# Patient Record
Sex: FEMALE | URBAN - METROPOLITAN AREA
[De-identification: names, ages, dates, MRNs, and addresses within clinical notes are randomized per-mention and may not be internally consistent; named-entity substitution may affect disease eponyms.]

---

## 2024-01-22 ENCOUNTER — APPOINTMENT (RX ONLY)
Dept: URBAN - METROPOLITAN AREA CLINIC 98 | Facility: CLINIC | Age: 24
Setting detail: DERMATOLOGY
End: 2024-01-22

## 2024-01-22 DIAGNOSIS — Z41.9 ENCOUNTER FOR PROCEDURE FOR PURPOSES OTHER THAN REMEDYING HEALTH STATE, UNSPECIFIED: ICD-10-CM

## 2024-01-22 PROCEDURE — ? DVT RISK ASSESSMENT

## 2024-01-22 PROCEDURE — ? ADDITIONAL NOTES

## 2024-01-22 NOTE — PROCEDURE: ADDITIONAL NOTES
Render Risk Assessment In Note?: yes
Additional Notes: Patient's concerns include: \\n-  Breast lift and augmentation. \\n    - Currently is a 34 DD. \\n    -  Would like to be as natural as possible, but showed more augmented want photos. \\n    -  1-1.5 cup size increase, but unsure. Just want upper pole fullness, but no round breasts. \\n\\n\\nFactors altering surgical decisions (hx/exam findings): \\n-  Location:  [place], [place].\\n-  Referred:  Norma Zacarias.\\n-  Hx:  No prior pregnancies. 40 lbs weight loss. \\n-  Vitals:  128/63, 78. 205 lbs, 5' 10\". \\n-  Nicotine use:  Never. \\n-  Allergies:  NKDA. \\n-  PMH:  None.\\n-  PSH:  None.\\n-  Exam:  Mild asymmetry (Right increase volume/ptosis vs. Left breast). Bilateral grade 2 ptosis. Fatty breast tissue density. Superior breast pole stretch marks noted. Larger areolas. >2 cm soft tissue pinch along upper and lower breast poles. \\n\\n- Reviewed typical surgical recovery, activity restrictions (patient rock climbs), and surgical risks including infection, bleeding/hematoma, injury to adjacent anatomic structures, hypertrophic/keloid scars, asymmetry, delayed wound healing, skin and/or nipple necrosis, capsular contracture, JOSE-ALCL, BII. Discussed expected breast shape and changes during the post-op period. Discussed cannot guarantee a particular cup size. Also reviewed potential for retained areolar pigment in vertical incision limb. \\n\\n-  Preop requirements:\\n    -  Send at least 3 \"wish pics\" for breast size.  \\n\\n\\nProposed intervention(s):\\n-  Bilateral breast augmentation\\n    -  Implant type:  Moderate profile, Regular cohesive (SRM)\\n    -  Size range:  275, 295, 310, 330, 345, 360 cc (D:  11.5, 11.75, 12, 12.25, 12.5, 12.75)\\n    -  Target size:  330 cc \\n-  Bilateral mastopexy\\n    -  Wise pattern incision

## 2024-02-26 ENCOUNTER — RX ONLY (OUTPATIENT)
Age: 24
Setting detail: RX ONLY
End: 2024-02-26

## 2024-02-26 ENCOUNTER — APPOINTMENT (RX ONLY)
Dept: URBAN - METROPOLITAN AREA CLINIC 101 | Facility: CLINIC | Age: 24
Setting detail: DERMATOLOGY
End: 2024-02-26

## 2024-02-26 DIAGNOSIS — Z41.9 ENCOUNTER FOR PROCEDURE FOR PURPOSES OTHER THAN REMEDYING HEALTH STATE, UNSPECIFIED: ICD-10-CM

## 2024-02-26 PROCEDURE — ? PRE-OP WORKLIST

## 2024-02-26 PROCEDURE — ? ORDER TESTS

## 2024-02-26 RX ORDER — OXYCODONE HYDROCHLORIDE 5 MG/1
TABLET ORAL
Qty: 10 | Refills: 0 | Status: ACTIVE

## 2024-02-26 RX ORDER — ONDANSETRON 4 MG/1
1 TABLET, FILM COATED ORAL
Qty: 20 | Refills: 1 | Status: ERX | COMMUNITY
Start: 2024-02-26

## 2024-02-26 RX ORDER — CEPHALEXIN 500 MG/1
1 CAPSULE ORAL QID
Qty: 28 | Refills: 2 | Status: ERX | COMMUNITY
Start: 2024-02-26

## 2024-02-26 RX ORDER — CYCLOBENZAPRINE HYDROCHLORIDE 5 MG/1
1 TABLET, FILM COATED ORAL
Qty: 15 | Refills: 1 | Status: ERX | COMMUNITY
Start: 2024-02-26

## 2024-02-26 NOTE — PROCEDURE: PRE-OP WORKLIST
Recovery Facility: [facility]
Date Of Surgery: 03/26/24
Admission Status: outpatient
Surgeon: Dr. Antonio Duarte
Additional Instructions (Optional): Re-mote pre-op done by Mayte via phone.
Surgery Scheduled: Breast Augmentation with wise pattern lift
Detail Level: Detailed
Coordination With:: Mayte Kendall  ( Remote )
Estimated Length Of Stay: 1-2 Hours
Date Of Evaluation: 2/26/24
Photos Taken?: no

## 2024-03-25 ENCOUNTER — APPOINTMENT (RX ONLY)
Dept: URBAN - METROPOLITAN AREA CLINIC 101 | Facility: CLINIC | Age: 24
Setting detail: DERMATOLOGY
End: 2024-03-25

## 2024-03-25 DIAGNOSIS — Z41.9 ENCOUNTER FOR PROCEDURE FOR PURPOSES OTHER THAN REMEDYING HEALTH STATE, UNSPECIFIED: ICD-10-CM

## 2024-03-25 PROCEDURE — ? ADDITIONAL NOTES

## 2024-03-25 NOTE — PROCEDURE: ADDITIONAL NOTES
Additional Notes: ------------------------------\\nOPERATIVE NOTE:\\n-------------------------------\\n\\nProcedure: \\n-  Bilateral breast augmentation (dual plane). \\n-  Bilateral mastopexy (wise pattern incision).\\n-  Liposuction to abdomen and bilateral flanks. \\n\\nSurgeon:  \\n-  Antonio Duarte MD\\n\\nAnesthesia Provider:  \\n-  Essie Strickland CRNA\\n\\nAnesthesia:  \\n-  TIVA, LMA\\n\\nEstimated Blood Loss:  \\n-  Minimal\\n\\nComplications:  \\n-  None\\n\\nSpecimens:  \\n-  None\\n\\nPreoperative Diagnosis:\\n-  Micromastia\\n-  Breast ptosis\\n-  Breast asymmetry\\n-  Lateral thigh contour irregularities \\n\\nPostoperative Diagnosis: \\n-  Same as above\\n\\n-------------------------------\\n\\nProcedure Description:\\n\\nUnder full, informed consent, the patient was brought to the operating room. \\nFull continuous cardiac monitoring and automated blood-pressure measurements were performed per protocol. \\n\\nPatient was prepped circumferentially with a chlorhexidien solution in a standing position with arms out to the side. Patient was placed in supine position with arms out at side to <90 degrees onto a sterilely draped OR bed ensuring that hips were placed over bed joint for later flexion if needed. All bony prominences were padded and SCDs placed on lower legs. Patient underwent LMA intubation without difficulty. Skin was prepped again with a chlorhexidien solution and draped in a sterile fashion. \\n\\n\\n-  Bilateral breast augmentation (dual plane). \\n-  Bilateral mastopexy (wise pattern incision).\\n\\nPreop markings were checked for symmetry and markings reinforced. A nerve block was performed with Exparel along lateral, medial, and inferior borders of the breasts between the deep breast parenchyma and chest wall. \\n\\nCentral IMF incision was injected using local anesthesia (lidocaine 1% with 1:100,000 epinephrine). IMF incision was created with scalpel. Assistant used a double skin hook retractor, and dissection was made down to the chest wall. Skin hooks were exchanged for an OpenCloudy retractor. Dissection was then extended superiorly to the edge of pectoralis muscle. Mammary tissue was reflected off of the pectoralis muscle 1-2 cm superiorly from the inferior edge of the pectoralis muscle insertion.  \\n\\nPectoralis muscle was bluntly  at most lateral edge attaching it to the chest wall, this was further divided with bovie cautery until a finger could be placed in and a plane dissected bluntly, further pocket dissection was performed with bovie cautery ensuring that pectoralis minor and serratus muscles were kept in normal anatomic positions. Margins of the implant pocket were dissected to fit implant dimensions with maximal dissection limits based off the following - pectoralis chest wall attachments medially, clavicle superiorly, with limited lateral dissection to avoid later lateral implant malposition.\\n\\nA breast retractor was then used to visualize and achieve hemostasis with an extended/protected tip bovie cautery. Pectoralis muscle was divided with bovie cautery medially and up to the edge of the incision. With muscle stretched anteriorly with a breast retractor, muscle fibers were divided until a layer of fat could be seen. Division was performed 2-3 cm anterior to the inferior muscle wall attachment. \\n\\nA saline sizer was then placed and inflated to the desired size, the pocket was adjusted as necessary. Pocket closed with a running 3-0 monocryl suture. Steps were repeated on the contralateral side.\\n\\nSkin was tailor tacked closed using staples to take up any excess skin along the breast, ensuring vertical incision limb measured between 6-8 cm in length. Tailor tacked limbs were marked and staples removed. NAC was placed under equal stretch bilaterally and marked using a 40 mm NAC marker. Skin was scored along markings and skin de-epitheliazed within marked areas with scalpel. \\n\\nMammary tissue was excised, to a level superficial to the underlying breast fascia, along central vertical breast and inferior horizontal breast. Hemostasis achieved with bovie cautery. Superior NAC was sutured to superior central edge of de-epitheliazed skin and vertical and horizontal breast axis were tailor tacked in place with staples. Bilateral breast were measured for symmetry. \\n\\n-  Right breast tissue excision:  47 g (larger breast on preop)\\n-  Left breast tissue excision:  29 g\\n-  Total breast tissue excision:  76 g\\n\\nNAC was inset along 8 axises, vertical, and horizontal skin incisions were closed with multiple buried interrupted 3-0 monocryl sutures, leaving the central portion of IMF incision open for implant exchange. \\n\\nPocket sutures cut, sizer deflated by removing one way valve, pocket washed out with triple antibiotic solution x3 and then TXA solution. Hemostasis was assessed and bovie cautery used as needed to achieve hemostasis. Skin edges were washed with hibiclens and sterile towels laid down. Both assistant and surgeon then changed gloves and implant placed using a Meadows funnel with minimal touch technique. Silicone implants placed:\\n\\n-  Right breast implant:  345 cc SRM (smooth, round, moderate profile, low cohesive silicone breast implant)\\n-  Left breast implant:  345 cc SRM (smooth, round, moderate profile, low cohesive silicone breast implant)\\n\\nUnderlying fascia closed deeply with multiple interrupted 3-0 PDS suture. Deep mammary tissue closed with multiple interrupted 3-0 PDS suture. Dermis closed with 3-0 monocryl. \\n\\nSkin was closed with a subcuticular running 5-0 monocryl around the NAC and 4-0 monocryl along vertical and horizontal incisions. Surgical glue and steri-strips were placed over incisions. \\n\\n\\n-  Liposuction to abdomen and bilateral flanks. \\n\\nLiposuction cannula sites were marked and injected using lidocaine with epinephrine. ~1 cm stab incisions were made at marking sites and soft tissue spread with a hemostat. Tumescent was injected within the soft tissue plane with use of cannula. \\n\\nTumescent was injected into the following areas: \\n-  Abdomen:  700 ml. \\n-  Right flank:  300 ml. \\n-  Left flank:  300 ml. \\n-  Total:  1,300 ml. \\n\\nTumescent was allowed to sit for ~10 mins prior to performing liposuction. \\n\\nSoft tissue pre-tunneling and fat separation was preformed with a 4 mm exploded tip lipo cannula (no suction connected).  \\n\\nLiposuction was then performed to preoperatively marked target areas. Dilute hibiclens was applied for lubrication regularly around the port sites. It was ensured that skin was not being tented up or incision edges were not being significantly distorted during cannula movement. Mechanical assist was used to prevent any unnecessarily firm cannula movements. A fanning and cross hatching pattern was used to decrease likelihood of later skin contour irregularities. Skin flaps were pinched regularly to assess uniform flap thickness and target any unevenness. \\n\\nLipoaspirate was collected from the following areas: \\n-  Abdomen:  400 ml.\\n-  Right flank:  200 ml. \\n-  Left flank:  200 ml. \\n-  Total:  800 ml. \\n\\nFat equalization was preformed with a 4 mm exploded tip lipo cannula (no suction connected).  \\n\\nIncisions were closed with a buried interrupted 4-0 monocryl. Bandaids placed over incisions. Topifoam placed evenly over areas of liposuction and abdominal binder placed firmly over the abdomen. Breast was covered in abdominal pad and then surgical bra placed. \\n\\nSponge counts and needle counts were reported correct by the circulating nurse in charge.\\nThe patient was transferred to the recovery area in stable condition. \\nPatient tolerated the procedure well without complication.\\nI was present for the entire procedure.\\n\\n-------------------------------
Render Risk Assessment In Note?: no

## 2024-03-26 ENCOUNTER — APPOINTMENT (RX ONLY)
Dept: URBAN - METROPOLITAN AREA CLINIC 128 | Facility: CLINIC | Age: 24
Setting detail: DERMATOLOGY
End: 2024-03-26

## 2024-03-27 ENCOUNTER — APPOINTMENT (RX ONLY)
Dept: URBAN - METROPOLITAN AREA CLINIC 101 | Facility: CLINIC | Age: 24
Setting detail: DERMATOLOGY
End: 2024-03-27

## 2024-03-27 DIAGNOSIS — Z41.9 ENCOUNTER FOR PROCEDURE FOR PURPOSES OTHER THAN REMEDYING HEALTH STATE, UNSPECIFIED: ICD-10-CM

## 2024-03-27 PROCEDURE — 99024 POSTOP FOLLOW-UP VISIT: CPT

## 2024-03-27 PROCEDURE — ? COUNSELING - POST-OP CHECK

## 2024-04-01 ENCOUNTER — APPOINTMENT (RX ONLY)
Dept: URBAN - METROPOLITAN AREA CLINIC 101 | Facility: CLINIC | Age: 24
Setting detail: DERMATOLOGY
End: 2024-04-01

## 2024-04-01 DIAGNOSIS — Z41.9 ENCOUNTER FOR PROCEDURE FOR PURPOSES OTHER THAN REMEDYING HEALTH STATE, UNSPECIFIED: ICD-10-CM

## 2024-04-01 PROCEDURE — ? ADDITIONAL NOTES

## 2024-04-15 ENCOUNTER — APPOINTMENT (RX ONLY)
Dept: URBAN - METROPOLITAN AREA CLINIC 101 | Facility: CLINIC | Age: 24
Setting detail: DERMATOLOGY
End: 2024-04-15

## 2024-04-15 DIAGNOSIS — Z41.9 ENCOUNTER FOR PROCEDURE FOR PURPOSES OTHER THAN REMEDYING HEALTH STATE, UNSPECIFIED: ICD-10-CM

## 2024-04-15 PROCEDURE — ? ADDITIONAL NOTES

## 2024-08-08 ENCOUNTER — APPOINTMENT (RX ONLY)
Dept: URBAN - METROPOLITAN AREA CLINIC 101 | Facility: CLINIC | Age: 24
Setting detail: DERMATOLOGY
End: 2024-08-08

## 2025-03-03 NOTE — PROCEDURE: ORDER TESTS
Resident/Fellow
Expected Date Of Service: 02/26/2024
Billing Type: Third-Party Bill
Bill For Surgical Tray: no